# Patient Record
Sex: FEMALE | Race: ASIAN | NOT HISPANIC OR LATINO | Employment: FULL TIME | URBAN - METROPOLITAN AREA
[De-identification: names, ages, dates, MRNs, and addresses within clinical notes are randomized per-mention and may not be internally consistent; named-entity substitution may affect disease eponyms.]

---

## 2024-02-06 PROBLEM — H61.893 EAR CANAL DRYNESS, BILATERAL: Status: ACTIVE | Noted: 2024-02-06

## 2024-02-06 PROBLEM — H90.A21 SENSORINEURAL HEARING LOSS (SNHL) OF RIGHT EAR WITH RESTRICTED HEARING OF LEFT EAR: Status: ACTIVE | Noted: 2024-02-06

## 2024-02-06 PROBLEM — H92.11 OTORRHEA, RIGHT EAR: Status: ACTIVE | Noted: 2024-02-06

## 2024-02-06 PROBLEM — H90.A32 MIXED CONDUCTIVE AND SENSORINEURAL HEARING LOSS OF LEFT EAR WITH RESTRICTED HEARING OF RIGHT EAR: Status: ACTIVE | Noted: 2024-02-06

## 2024-02-06 PROBLEM — H61.20 EXCESSIVE CERUMEN IN EAR CANAL: Status: ACTIVE | Noted: 2024-02-06

## 2024-02-06 PROBLEM — H61.23 BILATERAL IMPACTED CERUMEN: Status: ACTIVE | Noted: 2024-02-06

## 2024-02-06 PROBLEM — H73.892: Status: ACTIVE | Noted: 2024-02-06

## 2024-02-06 PROBLEM — H92.12 OTORRHEA, LEFT: Status: ACTIVE | Noted: 2024-02-06

## 2024-02-06 PROBLEM — H72.93 PERFORATION OF BOTH TYMPANIC MEMBRANES: Status: ACTIVE | Noted: 2024-02-06

## 2024-02-06 RX ORDER — LYSINE HCL 500 MG
TABLET ORAL
COMMUNITY
Start: 2021-08-02 | End: 2024-02-19 | Stop reason: ALTCHOICE

## 2024-02-12 PROBLEM — I10 ESSENTIAL (PRIMARY) HYPERTENSION: Status: ACTIVE | Noted: 2020-01-27

## 2024-02-12 RX ORDER — LOSARTAN POTASSIUM 100 MG/1
100 TABLET ORAL
COMMUNITY
Start: 2023-09-26

## 2024-02-15 NOTE — PROGRESS NOTES
Subjective   Patient ID: Sammy Estevez is a 54 y.o. female who presents for No chief complaint on file..  HPI  This 54-year-old female is being seen back in the office for recheck of her ears.  She has chronic perforations and retractions of the tympanic membrane perforation mostly noted on the left.  She has a mixed hearing loss left ear lower than the right.  In previous years she had had difficulties with otorrhea none has occurred recently.  Despite the hearing loss she has chosen against amplification and or consultation regarding surgical care.    Review of Systems  A 12 point ROS has been reviewed and are negative for complaint except what is stated in the history of present illness and/or past medical history as noted in the EMR  Past Medical History:   Diagnosis Date    Allergy status to unspecified drugs, medicaments and biological substances     History of seasonal allergies    Otitis media, unspecified, unspecified ear 11/24/2015    Ear infection       Current Outpatient Medications:     losartan (Cozaar) 100 mg tablet, Take 1 tablet (100 mg) by mouth once daily., Disp: , Rfl:     multivitamin tablet, Take 1 tablet by mouth once daily., Disp: , Rfl:     No Known Allergies    Objective  Height 1.524 m (5'), weight 58.1 kg (128 lb).   Physical Exam  EXAMINATION:    GENERAL PALOMO.EARANCE: Alert, in no acute distress, normal pitch and clarity of voice, well-developed and nourished, cooperative.    HEAD/FACE: Normocephalic, atraumatic, normal facial movements and strength, no no tenderness to palpation, no lesions noted.    SKIN: Normal turgor, no raised or ulcerative lesions, warm and dry to palpation.    EYES: Extraocular motions intact, no nystagmus noted, pupils equal and reactive to light and accommodation, no conjunctivitis.    EARS: See procedure note    NOSE: No external skin lesions are noted, nares are patent, septum is intact, sinuses are nontender to palpation bilaterally, no intranasal lesions  or inflammation is noted, nasal valve is normal.    OROPHARYNX/ORAL CAVITY: Oropharynx is not inflamed and is without lesions, mucosa of the oral cavity is intact and without lesions, tongue is midline and mobile, no acute dental disease is noted, TMJs are mobile    LUNG-- NO wheezing or rhonchi normal respiratory effort    HEART-- No venous congestion,  rate and rhythm regular,    NECK: No lymphadenopathy is palpated, carotid pulses are intact, neck is supple with full range of motion, no thyroid abnormalities are noted, trachea is midline, no neck masses are palpated.    LYMPHATICS: No cervical adenopathy or supraclavicular adenopathy is palpated.    NEUROLOGIC/PSYCH; alert and oriented, cranial nerves are grossly intact, gait is without falling, no motor deficits are noted.    Patient ID: Sammy Estevez is a 54 y.o. female.    Binocular microscopy    Date/Time: 2/15/2024 2:06 PM    Performed by: Pradip Saha DMD, MD  Authorized by: Pradip Saha DMD, MD    Consent:     Consent obtained:  Verbal    Consent given by:  Patient    Risks discussed:  Pain    Alternatives discussed:  No treatment and observation  Procedure details:     Scope location: bilateral    Post-procedure details:     Patient tolerance of procedure:  Tolerated well, no immediate complications  Comments:      No external skin lesions were noted on either side. Nonobstructive cerumen was in the deeper aspect of the ear canals bilaterally which is removed otoscopically on the right-hand side . there is a perforation of the tympanic membrane with some sclerosis and thinning of the remaining periphery of the tympanic membrane. No cholesteatoma or active discharge noted. Stable exam. On the left-hand side, Severe perforation of the tympanic membrane with adhesions of the malleus to the promontory is noted There did not appear to be any ingrowth of epithelium into the middle ear.. Dried ceruminous and skin debris was removed from the middle  of the ear canal with a cup forceps. Tympanosclerotic changes were noted around the region of the stapes bone and annulus. this did not appear to be cholesteatoma material, no change from previous exams once again.    Assessment/Plan   Problem List Items Addressed This Visit             ICD-10-CM    Bilateral impacted cerumen H61.23    Mixed conductive and sensorineural hearing loss of left ear with restricted hearing of right ear H90.A32    Ear canal dryness, bilateral H61.893    Perforation of both tympanic membranes H72.93    Retraction of tympanic membrane, left H73.892    Sensorineural hearing loss (SNHL) of right ear with restricted hearing of left ear - Primary H90.A21     .   discussed the clinical finds with the patient. Her exam once the dry skin was removed from the ear canal appeared unchanged with no signs of ingrowth of epithelium or cholesteatoma. She does have significant structural problems to the tympanic membrane's bilaterally. She has better hearing on her right than on her left. There is been no signs of otorrhea. She knows to keep water out of her ears especially on the right due to the perforation. If she does have any difficulties with pain drainage or changes in hearing she is to contact the office. She is a candidate for either surgical care of her ear or amplification of her hearing which she is not at this point ready to embark on. As such she is going to be followed clinically with recheck in 6 months. She is to contact the office if she has any type of otorrhea pain or sudden change in hearing.  An audiogram will be scheduled for her next visit.    Pradip Saha DMD, MD 02/15/24 2:03 PM

## 2024-02-19 ENCOUNTER — OFFICE VISIT (OUTPATIENT)
Dept: OTOLARYNGOLOGY | Facility: CLINIC | Age: 54
End: 2024-02-19
Payer: COMMERCIAL

## 2024-02-19 VITALS — HEIGHT: 60 IN | BODY MASS INDEX: 25.13 KG/M2 | WEIGHT: 128 LBS

## 2024-02-19 DIAGNOSIS — H61.23 BILATERAL IMPACTED CERUMEN: ICD-10-CM

## 2024-02-19 DIAGNOSIS — H90.A21 SENSORINEURAL HEARING LOSS (SNHL) OF RIGHT EAR WITH RESTRICTED HEARING OF LEFT EAR: Primary | ICD-10-CM

## 2024-02-19 DIAGNOSIS — H73.892 RETRACTION OF TYMPANIC MEMBRANE, LEFT: ICD-10-CM

## 2024-02-19 DIAGNOSIS — H61.893 EAR CANAL DRYNESS, BILATERAL: ICD-10-CM

## 2024-02-19 DIAGNOSIS — H72.93 PERFORATION OF BOTH TYMPANIC MEMBRANES: ICD-10-CM

## 2024-02-19 DIAGNOSIS — H90.A32 MIXED CONDUCTIVE AND SENSORINEURAL HEARING LOSS OF LEFT EAR WITH RESTRICTED HEARING OF RIGHT EAR: ICD-10-CM

## 2024-02-19 PROCEDURE — 69210 REMOVE IMPACTED EAR WAX UNI: CPT | Performed by: OTOLARYNGOLOGY

## 2024-02-19 PROCEDURE — 1036F TOBACCO NON-USER: CPT | Performed by: OTOLARYNGOLOGY

## 2024-02-19 PROCEDURE — 99213 OFFICE O/P EST LOW 20 MIN: CPT | Performed by: OTOLARYNGOLOGY

## 2024-02-19 RX ORDER — BISMUTH SUBSALICYLATE 262 MG
1 TABLET,CHEWABLE ORAL DAILY
COMMUNITY

## 2024-08-16 NOTE — PROGRESS NOTES
Subjective   Patient ID: Sammy Estevez is a 54 y.o. female who presents for Follow-up and Hearing Loss.  HPI  This 54-year-old female is being seen back in the office for recheck of her ears. She has chronic perforations and retractions of the tympanic membrane perforation mostly noted on the left. She has a mixed hearing loss left ear lower than the right. In previous years she had had difficulties with otorrhea none has occurred recently. Despite the hearing loss she has chosen against amplification and or consultation regarding surgical care.   Review of Systems  A 12 point ROS has been reviewed and are negative for complaint except what is stated in the history of present illness and/or past medical history as noted in the EMR  Objective   Physical Exam  EXAMINATION:     GENERAL PALOMO.EARANCE: Alert, in no acute distress, normal pitch and clarity of voice, well-developed and nourished, cooperative.     HEAD/FACE: Normocephalic, atraumatic, normal facial movements and strength, no no tenderness to palpation, no lesions noted.     SKIN: Normal turgor, no raised or ulcerative lesions, warm and dry to palpation.     EYES: Extraocular motions intact, no nystagmus noted, pupils equal and reactive to light and accommodation, no conjunctivitis.     EARS: See procedure note     NOSE: No external skin lesions are noted, nares are patent, septum is intact, sinuses are nontender to palpation bilaterally, no intranasal lesions or inflammation is noted, nasal valve is normal.     OROPHARYNX/ORAL CAVITY: Oropharynx is not inflamed and is without lesions, mucosa of the oral cavity is intact and without lesions, tongue is midline and mobile, no acute dental disease is noted, TMJs are mobile     LUNG-- NO wheezing or rhonchi normal respiratory effort     HEART-- No venous congestion,  rate and rhythm regular,     NECK: No lymphadenopathy is palpated, carotid pulses are intact, neck is supple with full range of motion, no  thyroid abnormalities are noted, trachea is midline, no neck masses are palpated.     LYMPHATICS: No cervical adenopathy or supraclavicular adenopathy is palpated.     NEUROLOGIC/PSYCH; alert and oriented, cranial nerves are grossly intact, gait is without falling, no motor deficits are noted.    Patient ID: Smamy Estevez is a 54 y.o. female.    Binocular microscopy    Date/Time: 8/19/2024 3:54 PM    Performed by: Pradip Saha DMD, MD  Authorized by: Pradip Saha DMD, MD    Consent:     Consent obtained:  Verbal    Consent given by:  Patient    Risks discussed:  Pain    Alternatives discussed:  No treatment and observation  Procedure details:     Indications: examination of tympanic membrane and debridement    Post-procedure details:     Patient tolerance of procedure:  Tolerated well, no immediate complications  Comments:      Microscopic evaluation of the right ear revealed nonobstructive cerumen removed with a wax loop.  The tympanic membrane has a perforation in the anterior inferior quadrant no drainage is noted.  Some sclerotic changes are noted to the remaining tympanic membrane.  On the left-hand side similar nonobstructive ceruminous debris was removed with a wax loop.  At the level of the tympanic membrane there is severe adhesive otitis media.  This is chronic there is no signs of otorrhea or a middle ear space.  No inflammation is noted.      Her audiogram today on the right side continues to show a mostly mild sensorineural hearing loss with intact discrimination ability at 60 dB.  On the left-hand side there is a mixed hearing loss mostly moderate with still 100% discrimination at 70 dB.  Tympanograms reflect the perforation on the right and the adhesive otitis on the left  Assessment/Plan   Problem List Items Addressed This Visit             ICD-10-CM    Bilateral impacted cerumen - Primary H61.23    Mixed conductive and sensorineural hearing loss of left ear with restricted hearing of  right ear H90.A32    Retraction of tympanic membrane, left H73.892    Sensorineural hearing loss (SNHL) of right ear with restricted hearing of left ear H90.A21     Other Visit Diagnoses         Codes    Tympanic membrane perforation, right     H72.91          I discussed the clinical finds with the patient.  From the standpoint of her exam there is no signs of inflammation or otorrhea.  The damage to the tympanic membranes is unchanged.  The general hearing levels are discussed in reference to amplification of the hearing which would be beneficial when she is motivated to pursue it.  There is no need for surgical care.  Medications are not needed at this point but she is to contact the office if there is any pain or otorrhea.  She stays in a 6-month checkup schedule with a yearly audiogram.    This patient is advised to follow up with their PCP for all other health care issues and treatment. Dictation was done with dragon transcription and errors in spelling  and diction are possible.       Pradip Saha DMD, MD 08/19/24 3:57 PM

## 2024-08-19 ENCOUNTER — APPOINTMENT (OUTPATIENT)
Dept: OTOLARYNGOLOGY | Facility: CLINIC | Age: 54
End: 2024-08-19
Payer: COMMERCIAL

## 2024-08-19 ENCOUNTER — APPOINTMENT (OUTPATIENT)
Dept: AUDIOLOGY | Facility: CLINIC | Age: 54
End: 2024-08-19
Payer: COMMERCIAL

## 2024-08-19 VITALS — WEIGHT: 128 LBS | HEIGHT: 60 IN | BODY MASS INDEX: 25.13 KG/M2

## 2024-08-19 DIAGNOSIS — H90.A21 SENSORINEURAL HEARING LOSS (SNHL) OF RIGHT EAR WITH RESTRICTED HEARING OF LEFT EAR: ICD-10-CM

## 2024-08-19 DIAGNOSIS — H69.93 DYSFUNCTION OF BOTH EUSTACHIAN TUBES: ICD-10-CM

## 2024-08-19 DIAGNOSIS — H61.23 BILATERAL IMPACTED CERUMEN: Primary | ICD-10-CM

## 2024-08-19 DIAGNOSIS — H90.A32 MIXED CONDUCTIVE AND SENSORINEURAL HEARING LOSS OF LEFT EAR WITH RESTRICTED HEARING OF RIGHT EAR: ICD-10-CM

## 2024-08-19 DIAGNOSIS — H90.6 MIXED CONDUCTIVE AND SENSORINEURAL HEARING LOSS OF BOTH EARS: Primary | ICD-10-CM

## 2024-08-19 DIAGNOSIS — H72.91 PERFORATION OF RIGHT TYMPANIC MEMBRANE: ICD-10-CM

## 2024-08-19 DIAGNOSIS — H73.892 RETRACTION OF TYMPANIC MEMBRANE, LEFT: ICD-10-CM

## 2024-08-19 DIAGNOSIS — H72.91 TYMPANIC MEMBRANE PERFORATION, RIGHT: ICD-10-CM

## 2024-08-19 PROCEDURE — 1036F TOBACCO NON-USER: CPT | Performed by: OTOLARYNGOLOGY

## 2024-08-19 PROCEDURE — 92504 EAR MICROSCOPY EXAMINATION: CPT | Performed by: OTOLARYNGOLOGY

## 2024-08-19 PROCEDURE — 99214 OFFICE O/P EST MOD 30 MIN: CPT | Performed by: OTOLARYNGOLOGY

## 2024-08-19 PROCEDURE — 92567 TYMPANOMETRY: CPT | Performed by: AUDIOLOGIST

## 2024-08-19 PROCEDURE — 92557 COMPREHENSIVE HEARING TEST: CPT | Performed by: AUDIOLOGIST

## 2024-08-19 PROCEDURE — 3008F BODY MASS INDEX DOCD: CPT | Performed by: OTOLARYNGOLOGY

## 2024-08-19 NOTE — PROGRESS NOTES
[facility]  AUDIOMETRIC EVALUATION      Name:  Sammy Estevez   :  1970  Age:  54 y.o.  Date of Evaluation:  24    HISTORY    Sammy Estevez is seen today at the request of Pradip Saha M.D., ESPERANZA., F.A.C.S.  The patient is an established patient monitoring hearing loss progression.    EVALUATION  See scanned audiogram in Media and included at the end of this report.    RESULTS    Otoscopic Evaluation:  Right Ear:  clear   Left Ear:  clear    Tympanometry:   Right Ear:  large ear canal volume, consistent with a patent myringotomy tube or tympanic membrane perforation   Left Ear:  Type B, suggestive of middle ear fluid    Acoustic reflexes were not completed    Pure Tone Audiometry:    Right Ear:  mild to moderate mixed hearing loss  Left Ear:  moderate to severe mixed hearing loss       Speech Audiometry:    Right Ear:  excellent in quiet at an elevated presentation level  Left Ear:  excellent in quiet at an elevated presentation level  Speech reception thresholds were in good agreement with pure tone testing.    DISCUSSION  Results were relayed to Pradip Saha M.D., ESPERANZA., F.A.C.S.    APPOINTMENT TIME  3:00pm-3:30pm     Jannette Fernanedz  Doctor of Audiology  Senior Audiologist

## 2025-02-14 NOTE — PROGRESS NOTES
Subjective   Patient ID: Sammy Estevez is a 55 y.o. female who presents for Follow-up.  HPI  This 55-year-old female is being seen back in the office for recheck of her ears. She has chronic perforations and retractions of the tympanic membrane perforation mostly noted on the left. She has a mixed hearing loss left ear lower than the right. In previous years she had had difficulties with otorrhea none has occurred recently. Despite the hearing loss she has chosen against amplification and or consultation regarding surgical care.  Since last being seen she has had no difficulties with otorrhea or with discomfort.  She still feels as if she is hearing adequately enough at her present level.  Review of Systems   A 12 point ROS  has been reviewed and are negative for complaint except for what is stated in the history of present illness and /or for past medical history as noted in the EMR.    Past Medical History:   Diagnosis Date    Allergy status to unspecified drugs, medicaments and biological substances     History of seasonal allergies    Otitis media, unspecified, unspecified ear 11/24/2015    Ear infection          Current Outpatient Medications:     losartan (Cozaar) 100 mg tablet, Take 1 tablet (100 mg) by mouth once daily., Disp: , Rfl:     multivitamin tablet, Take 1 tablet by mouth once daily., Disp: , Rfl:      Social History     Tobacco Use    Smoking status: Never    Smokeless tobacco: Never   Substance Use Topics    Alcohol use: Never       No Known Allergies    There were no vitals taken for this visit.    Objective   Physical Exam  EXAMINATION:     GENERAL PALOMO.EARANCE: Alert, in no acute distress, normal pitch and clarity of voice, well-developed and nourished, cooperative.     HEAD/FACE: Normocephalic, atraumatic, normal facial movements and strength, no no tenderness to palpation, no lesions noted.     SKIN: Normal turgor, no raised or ulcerative lesions, warm and dry to palpation.     EYES:  Extraocular motions intact, no nystagmus noted, pupils equal and reactive to light and accommodation, no conjunctivitis.     EARS: See procedure note     NOSE: No external skin lesions are noted, nares are patent, septum is intact, sinuses are nontender to palpation bilaterally, no intranasal lesions or inflammation is noted, nasal valve is normal.     OROPHARYNX/ORAL CAVITY: Oropharynx is not inflamed and is without lesions, mucosa of the oral cavity is intact and without lesions, tongue is midline and mobile, no acute dental disease is noted, TMJs are mobile     LUNG-- NO wheezing or rhonchi normal respiratory effort     HEART-- No venous congestion,  rate and rhythm regular,     NECK: No lymphadenopathy is palpated, carotid pulses are intact, neck is supple with full range of motion, no thyroid abnormalities are noted, trachea is midline, no neck masses are palpated.     LYMPHATICS: No cervical adenopathy or supraclavicular adenopathy is palpated.     NEUROLOGIC/PSYCH; alert and oriented, cranial nerves are grossly intact, gait is without falling, no motor deficits are noted.    Patient ID: Sammy Estevez is a 55 y.o. female.    Binocular microscopy    Date/Time: 2/19/2025 4:04 PM    Performed by: Pradip Saha DMD, MD  Authorized by: Pradip Saha DMD, MD    Consent:     Consent obtained:  Verbal    Consent given by:  Patient    Risks discussed:  Pain    Alternatives discussed:  No treatment and observation  Post-procedure details:     Patient tolerance of procedure:  Tolerated well, no immediate complications  Comments:      Microscopic evaluation of both ears was done.  On the right-hand side there is a perforation in the inferior quadrants with some sclerotic changes.  No otorrhea is noted.  There was dried epithelial and ceruminous debris removed with a cup forcep from the ear canal wall.  On the left-hand side there is a large perforation with sclerotic changes no otorrhea similarly ceruminous  debris around the periphery of the canal both of which are likely secondary to her continued use of Q-tips.    Assessment/Plan   Problem List Items Addressed This Visit             ICD-10-CM    Bilateral impacted cerumen - Primary H61.23    Mixed conductive and sensorineural hearing loss of left ear with restricted hearing of right ear H90.A32    Ear canal dryness, bilateral H61.893    Sensorineural hearing loss (SNHL) of right ear with restricted hearing of left ear H90.A21     Other Visit Diagnoses         Codes    Tympanic membrane perforation, right     H72.91    Central perforation of tympanic membrane of left ear     H72.02             I discussed the clinical finds with the patient.  There is no change in the appearance of the ears from the standpoint of the scarring on the tympanic membrane's.  There is a perforation definitely on the right and what looks to be a large central perforation on the left which on past testing had also seemed possibly be due to severe retraction of the TM.  No otorrhea is noted.  She has had no subjective changes in hearing to report.  I cautioned her again about using Q-tips recommended that she avoid that ears and if there is any drainage though she should come    Pradip Saah DMD, MD 02/19/25 4:06 PM

## 2025-02-19 ENCOUNTER — APPOINTMENT (OUTPATIENT)
Dept: OTOLARYNGOLOGY | Facility: CLINIC | Age: 55
End: 2025-02-19
Payer: COMMERCIAL

## 2025-02-19 DIAGNOSIS — H90.A21 SENSORINEURAL HEARING LOSS (SNHL) OF RIGHT EAR WITH RESTRICTED HEARING OF LEFT EAR: ICD-10-CM

## 2025-02-19 DIAGNOSIS — H90.A32 MIXED CONDUCTIVE AND SENSORINEURAL HEARING LOSS OF LEFT EAR WITH RESTRICTED HEARING OF RIGHT EAR: ICD-10-CM

## 2025-02-19 DIAGNOSIS — H61.893 EAR CANAL DRYNESS, BILATERAL: ICD-10-CM

## 2025-02-19 DIAGNOSIS — H72.91 TYMPANIC MEMBRANE PERFORATION, RIGHT: ICD-10-CM

## 2025-02-19 DIAGNOSIS — H61.23 BILATERAL IMPACTED CERUMEN: Primary | ICD-10-CM

## 2025-02-19 DIAGNOSIS — H72.02 CENTRAL PERFORATION OF TYMPANIC MEMBRANE OF LEFT EAR: ICD-10-CM

## 2025-02-19 PROCEDURE — 99213 OFFICE O/P EST LOW 20 MIN: CPT | Performed by: OTOLARYNGOLOGY

## 2025-02-19 PROCEDURE — 1036F TOBACCO NON-USER: CPT | Performed by: OTOLARYNGOLOGY

## 2025-02-19 ASSESSMENT — PAIN SCALES - GENERAL: PAINLEVEL_OUTOF10: 0-NO PAIN

## 2025-02-19 ASSESSMENT — PATIENT HEALTH QUESTIONNAIRE - PHQ9
2. FEELING DOWN, DEPRESSED OR HOPELESS: NOT AT ALL
1. LITTLE INTEREST OR PLEASURE IN DOING THINGS: NOT AT ALL
SUM OF ALL RESPONSES TO PHQ9 QUESTIONS 1 AND 2: 0

## 2025-02-19 ASSESSMENT — COLUMBIA-SUICIDE SEVERITY RATING SCALE - C-SSRS: 1. IN THE PAST MONTH, HAVE YOU WISHED YOU WERE DEAD OR WISHED YOU COULD GO TO SLEEP AND NOT WAKE UP?: NO

## 2025-09-09 ENCOUNTER — APPOINTMENT (OUTPATIENT)
Dept: OTOLARYNGOLOGY | Facility: CLINIC | Age: 55
End: 2025-09-09
Payer: COMMERCIAL

## 2025-09-09 ENCOUNTER — APPOINTMENT (OUTPATIENT)
Dept: AUDIOLOGY | Facility: CLINIC | Age: 55
End: 2025-09-09
Payer: COMMERCIAL